# Patient Record
Sex: MALE | Race: BLACK OR AFRICAN AMERICAN | URBAN - METROPOLITAN AREA
[De-identification: names, ages, dates, MRNs, and addresses within clinical notes are randomized per-mention and may not be internally consistent; named-entity substitution may affect disease eponyms.]

---

## 2018-02-13 ENCOUNTER — HISTORICAL (OUTPATIENT)
Dept: LAB | Facility: HOSPITAL | Age: 48
End: 2018-02-13

## 2018-08-23 ENCOUNTER — HISTORICAL (OUTPATIENT)
Dept: ENDOSCOPY | Facility: HOSPITAL | Age: 48
End: 2018-08-23

## 2018-10-01 ENCOUNTER — HISTORICAL (OUTPATIENT)
Dept: RADIOLOGY | Facility: HOSPITAL | Age: 48
End: 2018-10-01

## 2022-04-09 ENCOUNTER — HISTORICAL (OUTPATIENT)
Dept: ADMINISTRATIVE | Facility: HOSPITAL | Age: 52
End: 2022-04-09

## 2022-04-26 VITALS
HEIGHT: 67 IN | SYSTOLIC BLOOD PRESSURE: 145 MMHG | BODY MASS INDEX: 32.8 KG/M2 | DIASTOLIC BLOOD PRESSURE: 84 MMHG | OXYGEN SATURATION: 99 % | WEIGHT: 209 LBS

## 2022-04-30 NOTE — OP NOTE
DATE OF SURGERY:    08/23/2018    SURGEON:  Reyes Burnette MD    ATTENDING PHYSICIAN:  Jenaro Sullivan MD    PREOPERATIVE DIAGNOSES:    1. Gastroesophageal reflux disease.    2. History of rectal polyp.    3. History of bright red blood per rectum and constipation.    POSTOPERATIVE DIAGNOSES:  5 mm polyp on greater curvature of the stomach, tortuous esophagus, possible gastritis.    PROCEDURE:  Esophagogastroduodenoscopy and screening colonoscopy.    COMPLICATIONS:  Unable to intubate the ascending colon or cecum.    ESTIMATED BLOOD LOSS:  Less than 5 mL.    SPECIMENS:  5 mm gastric polyp in the antrum on the greater curve.  Antrum biopsies x2 for MITUL as well as a body-of-the-stomach biopsy for histology.    PROCEDURE IN DETAIL:  After discussing the risks and benefits, and appropriate informed consent obtained, the patient was taken to the endoscopy suite and placed in the left lateral decubitus position.  He was then attached to the appropriate monitoring devices including continuous pulse oximetry and supplemental oxygenation via nasal cannula.  A time-out was performed that took place between Anesthesia, Nursing, and Surgery, then he underwent IV sedation with lidocaine and propofol provided by the anesthesiology department.  Procedure began with the EGD portion.  The gastroscope was inserted into the oropharynx and advanced under direct visualization to the esophagus, stomach, and duodenum.  The duodenum, including the 2nd portion, appeared normal without any abnormalities.  The scope was then slowly withdrawn carefully evaluating mucosa.  In the stomach antrum along the greater curve, he was found to have an approximately 5 mm polyp which was biopsied with a hot snare.  He also had some mild erythema suggesting possible gastritis, along with some mild nodularity in the body.  Some biopsies were taken and sent for MITUL of the antrum as well as a biopsy of the body of the stomach was sent for histology.  The scope  was then retroflexed, and we did not identify a hiatal hernia or any other mucosal abnormalities other than stated above.  The scope was then further withdrawn into the esophagus, where the Z-line appeared normal.  The patient did have a tortuous esophagus, but otherwise no other abnormalities.  At this point, the scope was removed and the patient was re-arranged and attention was turned to the colonoscopy portion of the procedure.  A MK was performed that did not identify any rectal masses or gross bleeding and he had normal tone.  A well-lubricated colonoscope was then inserted into the rectum and advanced with some difficulty to approximately the hepatic flexure; however, we were unable to intubate the ascending colon or cecum after applying multiple different strategies including pressure to the abdomen.  The scope was then slowly withdrawn from the hepatic flexure, carefully examining the mucosa.  We did not find any evidence of masses, polyps, AVMs, or inflammatory bowel disease from the hepatic flexure to the rectum.  In the rectum the scope was retroflexed and the only finding was some mild hemorrhoidal congestion, but no masses were seen.  The scope was then fully withdrawn and the procedure complete.  The patient tolerated the procedure well.    RECOMMENDATIONS:  Repeat colonoscopy in 5 years.  We will order a double-contrast barium enema to evaluate the right side.  Followup stomach biopsy and follow up with PCP.        ______________________________  Reyes Burnette MD    ______________________________  MD CAL Coats/MARVIN  DD:  08/23/2018  Time:  03:15PM  DT:  08/23/2018  Time:  04:36PM  Job #:  [id number]